# Patient Record
Sex: FEMALE
[De-identification: names, ages, dates, MRNs, and addresses within clinical notes are randomized per-mention and may not be internally consistent; named-entity substitution may affect disease eponyms.]

---

## 2017-01-01 ENCOUNTER — HOME CARE VISIT (OUTPATIENT)
Dept: SCHEDULING | Facility: HOME HEALTH | Age: 82
End: 2017-01-01
Payer: MEDICARE

## 2017-01-01 ENCOUNTER — PATIENT OUTREACH (OUTPATIENT)
Dept: CASE MANAGEMENT | Age: 82
End: 2017-01-01

## 2017-01-01 ENCOUNTER — HOME HEALTH ADMISSION (OUTPATIENT)
Dept: HOME HEALTH SERVICES | Facility: HOME HEALTH | Age: 82
End: 2017-01-01
Payer: MEDICARE

## 2017-01-01 ENCOUNTER — HOME HEALTH ADMISSION (OUTPATIENT)
Dept: HOME HEALTH SERVICES | Facility: HOME HEALTH | Age: 82
End: 2017-01-01

## 2017-01-01 ENCOUNTER — DOCUMENTATION ONLY (OUTPATIENT)
Dept: CASE MANAGEMENT | Age: 82
End: 2017-01-01

## 2017-01-01 ENCOUNTER — HOME CARE VISIT (OUTPATIENT)
Dept: HOME HEALTH SERVICES | Facility: HOME HEALTH | Age: 82
End: 2017-01-01
Payer: MEDICARE

## 2017-01-01 VITALS
HEART RATE: 82 BPM | RESPIRATION RATE: 18 BRPM | DIASTOLIC BLOOD PRESSURE: 62 MMHG | SYSTOLIC BLOOD PRESSURE: 118 MMHG | TEMPERATURE: 96.5 F | OXYGEN SATURATION: 96 %

## 2017-01-01 VITALS
TEMPERATURE: 95.7 F | RESPIRATION RATE: 18 BRPM | OXYGEN SATURATION: 95 % | DIASTOLIC BLOOD PRESSURE: 74 MMHG | SYSTOLIC BLOOD PRESSURE: 116 MMHG | HEART RATE: 102 BPM

## 2017-01-01 VITALS
RESPIRATION RATE: 17 BRPM | SYSTOLIC BLOOD PRESSURE: 110 MMHG | HEART RATE: 90 BPM | OXYGEN SATURATION: 95 % | TEMPERATURE: 95.2 F | DIASTOLIC BLOOD PRESSURE: 70 MMHG

## 2017-01-01 VITALS
HEART RATE: 60 BPM | TEMPERATURE: 98.1 F | SYSTOLIC BLOOD PRESSURE: 100 MMHG | DIASTOLIC BLOOD PRESSURE: 60 MMHG | OXYGEN SATURATION: 94 % | RESPIRATION RATE: 18 BRPM

## 2017-01-01 VITALS
HEART RATE: 101 BPM | RESPIRATION RATE: 18 BRPM | SYSTOLIC BLOOD PRESSURE: 102 MMHG | TEMPERATURE: 94.1 F | OXYGEN SATURATION: 95 % | DIASTOLIC BLOOD PRESSURE: 60 MMHG

## 2017-01-01 VITALS
HEART RATE: 87 BPM | RESPIRATION RATE: 16 BRPM | SYSTOLIC BLOOD PRESSURE: 118 MMHG | TEMPERATURE: 97.2 F | OXYGEN SATURATION: 95 % | DIASTOLIC BLOOD PRESSURE: 68 MMHG

## 2017-01-01 VITALS
DIASTOLIC BLOOD PRESSURE: 78 MMHG | OXYGEN SATURATION: 94 % | TEMPERATURE: 98.4 F | HEART RATE: 88 BPM | SYSTOLIC BLOOD PRESSURE: 128 MMHG | RESPIRATION RATE: 17 BRPM

## 2017-01-01 VITALS
HEART RATE: 80 BPM | RESPIRATION RATE: 18 BRPM | DIASTOLIC BLOOD PRESSURE: 60 MMHG | TEMPERATURE: 97.3 F | OXYGEN SATURATION: 97 % | SYSTOLIC BLOOD PRESSURE: 102 MMHG

## 2017-01-01 VITALS
DIASTOLIC BLOOD PRESSURE: 62 MMHG | RESPIRATION RATE: 18 BRPM | TEMPERATURE: 96.5 F | SYSTOLIC BLOOD PRESSURE: 104 MMHG | OXYGEN SATURATION: 94 % | HEART RATE: 76 BPM

## 2017-01-01 VITALS
TEMPERATURE: 98.6 F | DIASTOLIC BLOOD PRESSURE: 56 MMHG | RESPIRATION RATE: 18 BRPM | HEART RATE: 60 BPM | SYSTOLIC BLOOD PRESSURE: 96 MMHG

## 2017-01-01 VITALS
OXYGEN SATURATION: 94 % | SYSTOLIC BLOOD PRESSURE: 124 MMHG | RESPIRATION RATE: 18 BRPM | HEART RATE: 76 BPM | TEMPERATURE: 97.3 F | DIASTOLIC BLOOD PRESSURE: 76 MMHG

## 2017-01-01 VITALS
SYSTOLIC BLOOD PRESSURE: 110 MMHG | OXYGEN SATURATION: 95 % | TEMPERATURE: 96.7 F | RESPIRATION RATE: 16 BRPM | DIASTOLIC BLOOD PRESSURE: 62 MMHG | HEART RATE: 85 BPM

## 2017-01-01 VITALS
TEMPERATURE: 98.1 F | HEART RATE: 68 BPM | SYSTOLIC BLOOD PRESSURE: 120 MMHG | RESPIRATION RATE: 16 BRPM | DIASTOLIC BLOOD PRESSURE: 75 MMHG

## 2017-01-01 VITALS — OXYGEN SATURATION: 97 % | HEART RATE: 82 BPM | DIASTOLIC BLOOD PRESSURE: 70 MMHG | SYSTOLIC BLOOD PRESSURE: 115 MMHG

## 2017-01-01 PROCEDURE — 3331090001 HH PPS REVENUE CREDIT

## 2017-01-01 PROCEDURE — 3331090002 HH PPS REVENUE DEBIT

## 2017-01-01 PROCEDURE — G0151 HHCP-SERV OF PT,EA 15 MIN: HCPCS

## 2017-01-01 PROCEDURE — G0299 HHS/HOSPICE OF RN EA 15 MIN: HCPCS

## 2017-01-01 PROCEDURE — G0157 HHC PT ASSISTANT EA 15: HCPCS

## 2017-01-01 PROCEDURE — G0152 HHCP-SERV OF OT,EA 15 MIN: HCPCS

## 2017-01-01 PROCEDURE — G0158 HHC OT ASSISTANT EA 15: HCPCS

## 2017-01-01 PROCEDURE — 3331090003 HH PPS REVENUE ADJ

## 2017-01-01 PROCEDURE — 400013 HH SOC

## 2017-02-01 NOTE — PROGRESS NOTES
Spoke with patient's daughter. Patient has CLTC about 2 hours / week for light housekeeping. Daughter states that patient has Parkinson's with some recent significant decline.       Bed bound      Incontinent of bowel & bladder      Unable to feed herself - related to tremors - drinking Ensure only         Plan -     Call in to see about increasing CLTC hours - left message this morning @ [address] visit - evaluate for needs     Daughter interested to find out if patient eligible for hospice

## 2017-02-03 NOTE — PROGRESS NOTES
Spoke with Rufus Schmidt - Manager at [facility]       They will be providing care for patient from 0900 - 1700 daily until further notice.   Plan -       Home visit with patient / daughter on Monday 2/6/2017 - CINDA to accompany       Evaluate for Home Health vs. Hospice       Make patient a PCP appointment - discuss transportation for that

## 2017-02-06 NOTE — PROGRESS NOTES
CINDA CM mode home visit today with RN CM  -Patient in own home - bedbound and chairbound - due to tremors (cause yet not diagnosed), incontinent of bowel/ bladder? - has MOWs but due to tremors in hands difficult to feed self - can transfer to w/c at bed side but dififcult to use BSC at times - due to patient's ongoing pain issues with movement showers in bathroom with adaptive equipment (attempted with granddtr) \"too painful\" per patient - patient has had 2 days/week supplied through [facility] and daughter,Shanae, has expedited services in the home through [facility] (private pay) for M-W-F - RN JERMAIN placed call to RN CM at [facility] to request additional hours needed by patient based on present condition - will evaluate and reilly - RN CM and this SW CM will continue to monitor case.

## 2017-02-06 NOTE — PROGRESS NOTES
Home visit completed - [facility] present  Aide from [facility] - Helen Thomas - also present  Assessment updated. Aide states -       Found patient lying in her own stool upon arrival @ 0900 today - this substantiates what daughter had previously stated on Friday. Patient states that she has a BM every morning around 0400 - patient is bedbound/chairbound       Patient also states that this is what wakens her every day. Further Details of patient assessment who lives alone:       Patient has no recollection of the ED visit ([facility]) on January 26, 2017       Patient a reasonable historian - states she has not been out of the house in years and that she      requires help to get out of bed, but contradicts herself to states she gets up to have a cigarette. Patient states she has lost weight - but does not know how much. Albumin 4.2, per daughter has been drinking only Ensure. Patient has had MOW \"for years\" but cannot feed herself because of significant tremors. This morning the Aide helped the patient to eat. Patient has CLTC services 2 days per week per daughter. Call placed to [phone] [today] to inquire about increasing services related to patient's decline. Patient has Stage I pressure wound over coccyx area. Educated Aide and patient about off-loading this pressure via position changes every two hours with 30-40 minutes only on her back. Patient is a smoker - has about 3-4 cigarettes daily - has difficulty lighting these per Aide. Diagnosis from [facility] ED visit notes COPD on CXR. Patient denies shortness of breath, has never been on oxygen. While patient is not ambulatory this patient remains a fall risk related to getting out of bed and transferring herself to a W/C to go have a cigarette in the bathroom. Patient states she does do this on her own albeit slowly and painfully.     Plan - follow up call with patient's daughter  (who remains sick with the flu).       Visit to PCP absolutely necessary   Complete physical    Assessment for pain management   Face-to-face documentation to facilitate Home Health/Nursing - pressure wound should  have RN oversight       Spoke with [facility] re: eligibility - likely not related to Not oxygen dependent,      Albumin within normal range, will eat if fed. Continue with Case Management oversight to monitor for decline        Interface with TC nurse in order to make sure patient is cared for in her home        Interface with family to provide support, facilitate services as patient declines.

## 2017-02-06 NOTE — Clinical Note
Please read my note for the many details on this patient's condition. Please call me with questions. Of note - this patient is taking hydrocodone 7.5/500 - 1/2 tablet for pain - this prescription is not her own. Patient is in considerable pain - I suspect R/T osteoporosis increased with movement. The pressure wound needs RN oversight - please provide documentation for face-to-face and place referral for [place]. I suspect hospice will be appropriate down the road - but not yet - I don't have a qualifying dx, particularly since patient still smokes, not O2 dependent, asymptomatic. There is no \"real\" dx of Parkinson's - patient has just been told she has this. Cognition fairly intact - forgetful and can be exacerbated by stress/anxiety.  Jackie Toribio, MSN, RN, [place]

## 2017-02-07 PROBLEM — M19.90 OA (OSTEOARTHRITIS): Status: ACTIVE | Noted: 2017-01-01

## 2017-02-07 PROBLEM — R32 INCONTINENCE: Status: ACTIVE | Noted: 2017-01-01

## 2017-02-07 PROBLEM — R53.81 DEBILITY: Status: ACTIVE | Noted: 2017-01-01

## 2017-02-07 PROBLEM — M81.0 OSTEOPOROSIS: Status: ACTIVE | Noted: 2017-01-01

## 2017-02-07 PROBLEM — J44.9 COPD (CHRONIC OBSTRUCTIVE PULMONARY DISEASE) (HCC): Status: ACTIVE | Noted: 2017-01-01

## 2017-02-07 PROBLEM — L89.91 PRESSURE ULCER, STAGE 1: Status: ACTIVE | Noted: 2017-01-01

## 2017-02-07 PROBLEM — F32.A DEPRESSION: Status: ACTIVE | Noted: 2017-01-01

## 2017-02-07 PROBLEM — G20 PARKINSON'S DISEASE (HCC): Status: ACTIVE | Noted: 2017-01-01

## 2017-02-07 PROBLEM — F17.200 TOBACCO USE DISORDER: Status: ACTIVE | Noted: 2017-01-01

## 2017-02-07 PROBLEM — K52.9 CHRONIC DIARRHEA: Status: ACTIVE | Noted: 2017-01-01

## 2017-02-07 PROBLEM — G25.81 RLS (RESTLESS LEGS SYNDROME): Status: ACTIVE | Noted: 2017-01-01

## 2017-02-07 NOTE — PROGRESS NOTES
Received a call back from  @ [address]       She will facilitate additional hours for this patient but states that they do not provide skilled nursing care  Plan -  Patient to see Primary Care Provider tomorrow - this will be a \"new patient\" visit as Dr. Laly Santiago is out on Medical Leave  Goals of visit   Establish care, full MD assessment - blood work may not be needed as recent labs were drawn @ [address] 1/26/2017, need a weight   Obtain order for [address] RN to oversee wound care   Evaluate pain - make sure patient has ongoing support for pain medication - perhaps NSAID?    to facilitate Home Health for RN, possible P.T. ?

## 2017-02-08 NOTE — PROGRESS NOTES
Accompanied patient to the PCP - visit completed with Dr. Jaky Swenson  Also spoke with daughter. Patient also accompanied by [facility], LPN from [facility]  Patient states she feels better today - was able to get to the bathroom in order to have a BM this am.        Up until today patient has been having loose stools - she explained to PCP that she takes Imodium, but hasn't been taking it \"lately. \"  Home Health order entered by Dr. Cathie Forman for RN, PT, OT  Spoke with Romi Abel @ [facility] regarding pressure wound over coccyx area  Plan to follow along with [facility] and also provide support to family

## 2017-02-08 NOTE — PROGRESS NOTES
CINDA CM present with RN CM during home visit to patient's house on  - awaiting confirmation of increased hours through CLTC being explored by RN CM - confirmation received today of increase to 20 hours/week and patient's daughter, Blas Irving, stated that hours are now  from 3-7 PM five days per week - outreach telephone call made to patient's daughter today by this SW - told daughter that CINDA ALY would be contacting [address]  to confirm that several different services currently in home (private pay aide through [facility] and PT/OT through [facility] services) would not impact CLTC service level currently made available to patient - additionally, patient is not subject to Medicaid Recovery Act (for [address] services received since ) as home that she resides in is not in her name - this CINDA ALY also provided daughter with contact information  for [address] as patient's spouse of 27 years was a  of the [employer] (now ) and patient is possibly eligible for in home assistance through Aid and Attendance - daughter stated that she will look into this further - also reminded daughter of home visit on 2/15 by this CINDA ALY and RN CM.

## 2017-02-08 NOTE — Clinical Note
Thanks so much for seeing this patient yesterday - I will keep you updated. HH will assess patient today.

## 2017-02-09 NOTE — PROGRESS NOTES
Spoke with patient and also with Faviola Wiseman - today's caregiver  Minerva Abernathy found that patient was soiled on her arrival this a.m.    Buttocks continues very red - patient resistant about turning side to side        Reinforced this with the patient   Patient up for breakfast of egg and toast - requires assistance to eat/be fed   [address] to admit tomorrow  Plan - see patient/family Wednesday 2/15

## 2017-02-14 NOTE — PROGRESS NOTES
CINDA ALY placed call to [id number] ([facility] CM) to determine if other services currently in home LONG TERM ACUTE CARE [facility] AT [facility], private pay through agency) present any type of conflict to [facility] services patient is currently receiving - awaiting call back - joint home visit to be made with RN CM on 2/15.

## 2017-02-19 NOTE — PROGRESS NOTES
CINDA ALY made home visit with RN JERMAIN to patient's house during past week - also spent some time speaking with daughter re: long term planning to extend financial resources of patient so private pay services can remain in her home - CINDA ALY did contact [facility] CM, Osmany Leal (T# [phone]) to determine if there was any conflict with other services ([facility], Private pay aide) that are currently in the home - [facility] CM assured that no conflict existed - CINDA will contact [facility] CM back to determine if weekly hours can be increased to 25 for patient - daughter, Kalie Purcell, also investigated further possibility of patient being able to Apply for Aid and Attendance under conditions of divorce from second  - determined that conditions for divorce will not support Aid and Attendance application - CINDA ALY spoke further with daughter at end of week at request of RN JERMAIN - CINDA will assist daughter with contact information for various voucher programs to assist with respite and caregiving services for patient - will f/u with daughter to supply resources this next week.

## 2017-03-02 NOTE — PROGRESS NOTES
Patient's daughter called -  She reports that  from [employer] has re-assessed the patient  As a result patient's hours for CLTC will increase by 8 hours for a total of 28 hours per week.

## 2017-03-02 NOTE — Clinical Note
This is good news Dr. Jony Lovell - more help in the home for patient. She is declining however and will monitor for hospice appropriateness and keep you updated.  B

## 2017-03-24 NOTE — Clinical Note
Hi Dr. Patrick Kimbrough,  See progress note. Patient doing pretty well. We have caregivers coming into the home. We are happy to step back in should the need arise.  B.

## 2017-03-24 NOTE — PROGRESS NOTES
Call completed with patient's daughter - Cody Zhang  Patient doing pretty well -   Has adapted to having caregivers in the home   CLTC coming 28 hours per week   Daughter is monitoring pain and pain medications  Daughter confirms that she will take her mother/patient to the PCP on May 10, 2017    Daughter has our contact information should she have questions or feel the need for further CM services.

## 2017-05-10 PROBLEM — Z87.2 HISTORY OF PRESSURE ULCER: Status: ACTIVE | Noted: 2017-01-01

## 2017-05-10 PROBLEM — Z00.00 ANNUAL PHYSICAL EXAM: Status: ACTIVE | Noted: 2017-01-01

## 2017-05-10 PROBLEM — E46 MALNUTRITION (HCC): Status: ACTIVE | Noted: 2017-01-01

## 2017-05-30 NOTE — Clinical Note
See progress note. I left a message for [place]. (spoke with [facility]) Concern for C.  Diff, but also dehydration B.

## 2017-05-30 NOTE — PROGRESS NOTES
Patient's daughter/primary care giver called this morning.   Describes patient symptoms:  Lethargy  Decreased appetite, trouble swallowing   Decreased fluid intake - uncertain how long  Diarrhea x 2 weeks -   Has been treating with OTC medications    Care coordination call to PCP

## 2017-05-31 NOTE — PROGRESS NOTES
Call completed with patient's daughter  Dereck Hankins that patient should be seen either by PCP/Dr. Yeimi Solano, ER or Urgent Care  Daughter responds that she \"cannot get her there in her condition. \"  Advised daughter that patient is at risk for dehydration, which in the elderly can be life-threatening, also stated that patient's stools should be tested to diagnose for possible bacterial causes including C. Diff. Daughter responds that she will have to call 911 and that visits to the ED make her mother upset and confused. Reiterated that the PCP recommendation is for the patient to be seen.

## 2017-06-01 NOTE — PROGRESS NOTES
Text message from daughter received at 09:11  Patient was taken to [facility] yesterday pm, still testing, don't know anything yet. \"

## 2017-06-01 NOTE — PROGRESS NOTES
Incoming call from patient's daughter  1. Needs PCP to send further documentation to DME company - has left a voice mail for office nurse/CMA. Will route this note to PCP. 2.  Patient is [facility] - diagnostics ongoing per daughter  Recommended if there is a glitch with the hospital bed that she also get support from hospital inpatient  - they should be able to facilitate. 3.  Daughter will update as she knows more.

## 2017-06-02 NOTE — PROGRESS NOTES
Call to patient's daughter. Reports:  Patient remains inpatient  Hospital lost stool sample, had to get another yesterday  Patient on antibiotics, IV fluids [dehydration]  Patient's mouth hurting so badly yesterday - she would not drink a milkshake  Diarrhea continues per daughter's report    Daughter wants to bring her mother  Cautioned her about doing this prematurely - to make sure she consults with nurses and physicians thoroughly in order to prevent a relapse. Daughter to keep us updated.

## 2017-06-07 ENCOUNTER — PATIENT OUTREACH (OUTPATIENT)
Dept: CASE MANAGEMENT | Age: 82
End: 2017-06-07

## 2017-06-07 NOTE — PROGRESS NOTES
Called patient's daughter  Informed me that patient had  after coming home this past Monday. Daughter was the person who found patient.   Death is a 's case - but service is Friday afternoon @ 4:00 pm [employer]
